# Patient Record
Sex: FEMALE | Race: WHITE | NOT HISPANIC OR LATINO | Employment: OTHER | URBAN - METROPOLITAN AREA
[De-identification: names, ages, dates, MRNs, and addresses within clinical notes are randomized per-mention and may not be internally consistent; named-entity substitution may affect disease eponyms.]

---

## 2017-05-17 ENCOUNTER — TELEPHONE (OUTPATIENT)
Dept: PULMONOLOGY | Facility: HOSPICE | Age: 67
End: 2017-05-17

## 2021-03-03 DIAGNOSIS — Z23 NEED FOR VACCINATION: ICD-10-CM

## 2021-09-25 ENCOUNTER — OFFICE VISIT (OUTPATIENT)
Dept: URGENT CARE | Facility: PHYSICIAN GROUP | Age: 71
End: 2021-09-25
Payer: COMMERCIAL

## 2021-09-25 ENCOUNTER — APPOINTMENT (OUTPATIENT)
Dept: RADIOLOGY | Facility: IMAGING CENTER | Age: 71
End: 2021-09-25
Attending: PHYSICIAN ASSISTANT
Payer: COMMERCIAL

## 2021-09-25 VITALS
HEART RATE: 80 BPM | TEMPERATURE: 97 F | SYSTOLIC BLOOD PRESSURE: 110 MMHG | WEIGHT: 150 LBS | RESPIRATION RATE: 16 BRPM | DIASTOLIC BLOOD PRESSURE: 80 MMHG | BODY MASS INDEX: 28.32 KG/M2 | HEIGHT: 61 IN | OXYGEN SATURATION: 97 %
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
## 2021-09-25 DIAGNOSIS — L03.012 CELLULITIS OF FINGER OF LEFT HAND: ICD-10-CM

## 2021-09-25 PROCEDURE — 90471 IMMUNIZATION ADMIN: CPT | Performed by: PHYSICIAN ASSISTANT

## 2021-09-25 PROCEDURE — 99204 OFFICE O/P NEW MOD 45 MIN: CPT | Mod: 25 | Performed by: PHYSICIAN ASSISTANT

## 2021-09-25 PROCEDURE — 73140 X-RAY EXAM OF FINGER(S): CPT | Mod: TC,LT | Performed by: PHYSICIAN ASSISTANT

## 2021-09-25 PROCEDURE — 90715 TDAP VACCINE 7 YRS/> IM: CPT | Performed by: PHYSICIAN ASSISTANT

## 2021-09-25 RX ORDER — MELOXICAM 7.5 MG/1
TABLET ORAL
COMMUNITY
End: 2021-09-25

## 2021-09-25 RX ORDER — LEVOTHYROXINE SODIUM 0.12 MG/1
TABLET ORAL
COMMUNITY
End: 2021-09-25

## 2021-09-25 RX ORDER — AMOXICILLIN AND CLAVULANATE POTASSIUM 875; 125 MG/1; MG/1
1 TABLET, FILM COATED ORAL 2 TIMES DAILY
Qty: 14 TABLET | Refills: 0 | Status: SHIPPED | OUTPATIENT
Start: 2021-09-25 | End: 2021-10-02

## 2021-09-25 RX ORDER — MONTELUKAST SODIUM 10 MG/1
TABLET ORAL
COMMUNITY
End: 2021-09-25

## 2021-09-25 RX ORDER — ROPINIROLE 1 MG/1
TABLET, FILM COATED ORAL
COMMUNITY
End: 2021-09-25

## 2021-09-25 RX ORDER — PRAVASTATIN SODIUM 80 MG/1
TABLET ORAL
COMMUNITY
End: 2021-09-25

## 2021-09-25 RX ORDER — ATORVASTATIN CALCIUM 40 MG/1
TABLET, FILM COATED ORAL
COMMUNITY

## 2021-09-25 RX ORDER — FLUOXETINE HYDROCHLORIDE 20 MG/1
CAPSULE ORAL
COMMUNITY

## 2021-09-25 RX ORDER — INDOMETHACIN 50 MG/1
CAPSULE ORAL
COMMUNITY
End: 2021-09-25

## 2021-09-25 RX ORDER — AMOXICILLIN 500 MG/1
CAPSULE ORAL
COMMUNITY
End: 2021-09-25

## 2021-09-25 RX ORDER — PANTOPRAZOLE SODIUM 20 MG/1
TABLET, DELAYED RELEASE ORAL
COMMUNITY
End: 2021-09-25

## 2021-09-25 RX ORDER — MODAFINIL 200 MG/1
TABLET ORAL
COMMUNITY
End: 2021-09-25

## 2021-09-25 RX ORDER — TRIAMCINOLONE ACETONIDE 40 MG/ML
INJECTION, SUSPENSION INTRA-ARTICULAR; INTRAMUSCULAR
COMMUNITY
End: 2021-09-25

## 2021-09-25 RX ORDER — LEVOTHYROXINE SODIUM 112 UG/1
TABLET ORAL
COMMUNITY

## 2021-09-25 RX ORDER — AMOXICILLIN 875 MG/1
TABLET, COATED ORAL
COMMUNITY
End: 2021-09-25

## 2021-09-25 RX ORDER — IBUPROFEN 800 MG/1
TABLET ORAL
COMMUNITY
End: 2021-09-25

## 2021-09-25 ASSESSMENT — ENCOUNTER SYMPTOMS
SENSORY CHANGE: 0
TINGLING: 0

## 2021-09-25 NOTE — PROGRESS NOTES
"Subjective     Savannah De Santiago is a 71 y.o. female who presents with Animal Bite (happenend wednesday cat bit left hand index finger. )            Patient is a 71-year-old female who presents to urgent care with a cat bite to her left index finger that happened 4 days ago.  Patient does report that there are approximately 12 cats and adjacent lot next to hers of which she feeds routinely.  She picked up one of the kittens who then bit her on the finger.  She does report that she has been keeping the area clean however today she awoke with significant pain, redness and swelling.  She denies any foreign body sensation.  She does report slight crusting to the puncture sites however denies any puslike drainage.  Denies any streaking, fevers or chills.    Animal Bite  This is a new problem. The current episode started in the past 7 days. The problem occurs constantly. The problem has been gradually worsening. Exacerbated by: Bending her finger or pressure over the area. She has tried ice for the symptoms. The treatment provided mild relief.       Review of Systems   Musculoskeletal: Positive for joint pain.        Left index finger bite   Neurological: Negative for tingling and sensory change.   All other systems reviewed and are negative.             Objective     /80 (BP Location: Left arm, Patient Position: Sitting, BP Cuff Size: Adult)   Pulse 80   Temp 36.1 °C (97 °F) (Temporal)   Resp 16   Ht 1.549 m (5' 1\")   Wt 68 kg (150 lb)   LMP 01/01/1995   SpO2 97%   BMI 28.34 kg/m²      Physical Exam  Vitals reviewed.   Constitutional:       General: She is not in acute distress.     Appearance: She is well-developed.   HENT:      Head: Normocephalic and atraumatic.   Eyes:      Conjunctiva/sclera: Conjunctivae normal.      Pupils: Pupils are equal, round, and reactive to light.   Neck:      Trachea: No tracheal deviation.   Cardiovascular:      Rate and Rhythm: Normal rate.   Pulmonary:      Effort: Pulmonary " effort is normal.   Musculoskeletal:      Cervical back: Normal range of motion and neck supple.   Skin:     General: Skin is warm.      Findings: Erythema present. No rash.      Comments: No rash to area exposed during the visit today.   Left index finger: Diffuse erythema extending from the PIP to the distal finger on the dorsal aspect with 4 puncture bites with noted scab.  Without active bleeding.  Diffusely edematous.  Limited range of motion with flexion secondary to swelling.  Neurovascularly intact distally.  Mild tenderness noted.   Neurological:      Mental Status: She is alert and oriented to person, place, and time.      Coordination: Coordination normal.   Psychiatric:         Behavior: Behavior normal.         Thought Content: Thought content normal.         Judgment: Judgment normal.                           RADIOLOGY RESULTS   DX-FINGER(S) 2+ LEFT    Result Date: 9/25/2021 9/25/2021 12:46 PM HISTORY/REASON FOR EXAM:  Cat bite- r/o bony erosion. Left 2nd digit redness and pain, swelling. Recent cat bite . TECHNIQUE/EXAM DESCRIPTION AND NUMBER OF VIEWS:  3 views of the LEFT fingers. COMPARISON: None FINDINGS: There is no fracture. Evaluation of the 2nd digit shows no foreign body or evidence for osteomyelitis. Alignment: There is radial subluxation of the 1st carpal metacarpal joint. There are multiple sites of osteoarthritis including the 1st carpal metacarpal joint, 1st metacarpophalangeal joint, and multiple proximal interphalangeal and distal interphalangeal joint..     1.  No evidence for left index finger osteomyelitis or foreign body 2.  Multiple sites of osteoarthritis           Assessment & Plan        1. Cat bite, initial encounter  - DX-FINGER(S) 2+ LEFT; Future  - Tdap =>6yo IM  - amoxicillin-clavulanate (AUGMENTIN) 875-125 MG Tab; Take 1 Tablet by mouth 2 times a day for 7 days.  Dispense: 14 Tablet; Refill: 0    2. Cellulitis of finger of left hand  - DX-FINGER(S) 2+ LEFT; Future  -  Tdap =>6yo IM  - amoxicillin-clavulanate (AUGMENTIN) 875-125 MG Tab; Take 1 Tablet by mouth 2 times a day for 7 days.  Dispense: 14 Tablet; Refill: 0            Tetanus is updated today.  Animal paperwork was discussed with the patient today as well.  Patient is to return to clinic in 2 days for wound recheck an area of erythema was also marked today as well.  No evidence of tendon involvement at this time-and x-ray appeared clear without evidence of foreign body or noted osteomellitus.    Appropriate PPE worn at all times by provider.   Pt. Had face mask on throughout entirety of the visit other than oropharyngeal examination today.     Side effects of OTC or prescribed medications discussed.     DDX, Supportive care, and indications for immediate follow-up discussed with patient.    Instructed to return to clinic or nearest emergency department if we are not available for any change in condition, further concerns, or worsening of symptoms.    The patient and/or guardian demonstrated a good understanding and agreed with the treatment plan.    Please note that this dictation was created using voice recognition software. I have made every reasonable attempt to correct obvious errors, but I expect that there are errors of grammar and possibly content that I did not discover before finalizing the note.